# Patient Record
Sex: FEMALE | URBAN - METROPOLITAN AREA
[De-identification: names, ages, dates, MRNs, and addresses within clinical notes are randomized per-mention and may not be internally consistent; named-entity substitution may affect disease eponyms.]

---

## 2024-07-24 ENCOUNTER — TELEPHONE (OUTPATIENT)
Dept: PAIN MANAGEMENT | Age: 64
End: 2024-07-24

## 2024-07-24 NOTE — PROGRESS NOTES
Patient Name: Lupis Durant : 1960        Date: 2024      Type of Appt: Consult    Reason for appt: M54.16 (ICD-10-CM) - Chronic left-sided lumbar radiculopathy  R93.7 (ICD-10-CM) - Abnormal MRI, lumbar spine  R29.898 (ICD-10-CM) - Left leg weakness     Referred by:   Lacy Rodriguez APRN - NP     Studies done: 2024 - MRI SPINE LUMBAR W/O CONTRAST @ [facility]    Smoking: No                        [facility]  [address], [address]  [place], [place]  P: [phone]  F: [phone]          Patient:  Lupis Duratn  YOB: 1960  Date: 2024    The patient is a 63 y.o. female who presents today for evaluation of the following problems:     Chief Complaint   Patient presents with    Consultation    Back Pain        Referred by Lacy Rodriguez APRN*      PAST MEDICAL, FAMILY AND SOCIAL HISTORY:  No past medical history on file.  No past surgical history on file.  No family history on file.  Current Outpatient Medications on File Prior to Visit   Medication Sig Dispense Refill    gabapentin (NEURONTIN) 600 MG tablet Take 1 tablet by mouth 2 times daily.      furosemide (LASIX) 20 MG tablet Take 1 tablet by mouth daily      buPROPion (WELLBUTRIN XL) 150 MG extended release tablet Take 1 tablet by mouth daily      Cholecalciferol (VITAMIN D3) 25 MCG TABS Take 1 tablet by mouth daily      pyridoxine (B-6) 100 MG tablet Take 1 tablet by mouth daily      cyanocobalamin 500 MCG tablet Take 1 tablet by mouth daily      ibuprofen (ADVIL;MOTRIN) 200 MG tablet Take 1 tablet by mouth       No current facility-administered medications on file prior to visit.     Social History     Tobacco Use    Smoking status: Never    Smokeless tobacco: Never   Substance Use Topics    Alcohol use: Never    Drug use: Never       ALLERGIES  Allergies   Allergen Reactions    Fluoxetine Other (See Comments)         REVIEW OF SYSTEMS:  Review of Systems

## 2024-07-30 ENCOUNTER — INITIAL CONSULT (OUTPATIENT)
Age: 64
End: 2024-07-30
Payer: COMMERCIAL

## 2024-07-30 VITALS — BODY MASS INDEX: 50.26 KG/M2 | TEMPERATURE: 97.6 F | HEIGHT: 60 IN | WEIGHT: 256 LBS

## 2024-07-30 DIAGNOSIS — E66.01 CLASS 3 SEVERE OBESITY DUE TO EXCESS CALORIES WITH SERIOUS COMORBIDITY AND BODY MASS INDEX (BMI) OF 50.0 TO 59.9 IN ADULT (HCC): ICD-10-CM

## 2024-07-30 DIAGNOSIS — M51.27 HERNIATED NUCLEUS PULPOSUS, L5-S1, LEFT: Primary | ICD-10-CM

## 2024-07-30 PROCEDURE — 99204 OFFICE O/P NEW MOD 45 MIN: CPT | Performed by: NEUROLOGICAL SURGERY

## 2024-07-30 RX ORDER — CHOLECALCIFEROL (VITAMIN D3) 25 MCG
25 TABLET ORAL DAILY
COMMUNITY

## 2024-07-30 RX ORDER — IBUPROFEN 200 MG
200 TABLET ORAL
COMMUNITY

## 2024-07-30 RX ORDER — FUROSEMIDE 20 MG/1
20 TABLET ORAL DAILY
COMMUNITY
Start: 2020-01-30 | End: 2024-10-14

## 2024-07-30 RX ORDER — BUPROPION HYDROCHLORIDE 150 MG/1
150 TABLET ORAL DAILY
COMMUNITY
Start: 2024-07-10 | End: 2025-07-10

## 2024-07-30 RX ORDER — GABAPENTIN 600 MG/1
600 TABLET ORAL 2 TIMES DAILY
COMMUNITY
Start: 2024-07-17 | End: 2024-08-16

## 2024-07-30 ASSESSMENT — ENCOUNTER SYMPTOMS
CONSTIPATION: 0
SHORTNESS OF BREATH: 0
NAUSEA: 0
DIARRHEA: 1
BACK PAIN: 1
EYE PAIN: 0

## 2024-11-13 PROCEDURE — 93010 ELECTROCARDIOGRAM REPORT: CPT | Performed by: INTERNAL MEDICINE
